# Patient Record
(demographics unavailable — no encounter records)

---

## 2025-03-28 NOTE — ASSESSMENT
[FreeTextEntry1] : Herpes gingivostomatitis, acute, resolving - PCR confirmed HSV - s/p 10d valtrex - for residual gingivitis, rec clobet oint for 5 days then stop.  - long term steroid use side effects such as skin atrophy, hypopigmentation and telangiectasis discussed - patient agrees to use topical steroids sparingly and as prescribed - ok to continue lidocaine mouthwash if helpful.  RTC 6 weeks, sooner PRN

## 2025-03-28 NOTE — HISTORY OF PRESENT ILLNESS
[de-identified] : Mr. BRIAN NEVAREZ  is a 23 year  y/o M  here for evaluation of below   # Hospital derm consult follow up- seen in ER 2 weeks ago and PCR confirmed HSV1 primary infection. Tx with 10d of PO valtrex. Doing much better now. has some redness along gums that has been slowly improving but the other mouth sores have healed.      Social Hx: here today accompanied by his mom  [FreeTextEntry1] : [facility]

## 2025-03-28 NOTE — HISTORY OF PRESENT ILLNESS
[de-identified] : Mr. BRIAN NEVAREZ  is a 23 year  y/o M  here for evaluation of below   # Hospital derm consult follow up- seen in ER 2 weeks ago and PCR confirmed HSV1 primary infection. Tx with 10d of PO valtrex. Doing much better now. has some redness along gums that has been slowly improving but the other mouth sores have healed.      Social Hx: here today accompanied by his mom  [FreeTextEntry1] : [facility] fu

## 2025-03-28 NOTE — PHYSICAL EXAM
[FreeTextEntry3] : AAOx3, NAD, well-appearing / pleasant Focused body exam only (see below) per patient request: linear gingival erythema

## 2025-05-22 NOTE — ASSESSMENT
[FreeTextEntry1] : Herpes gingivostomatitis, acute, resolved - PCR confirmed HSV1 - s/p 10d valtrex - for residual gingivitis, resolved with clobetasol ointment, pt has stopped using - sent valtrex 2g BID for 1 day with refills in case it recurs  RTC PRN

## 2025-05-22 NOTE — HISTORY OF PRESENT ILLNESS
[FreeTextEntry1] : [facility] fu [de-identified] : Mr. BRIAN NEVAREZ  is a 23 year  y/o M  here for evaluation of below   # Hospital derm consult follow up- seen in ER 2 weeks ago and PCR confirmed HSV1 primary infection. Tx with 10d of PO valtrex. Doing much better now. used clobetasol for residual gingivitis with good improvement, has stopped clobeatsol requesting prescription of valtrex in case of recurrence

## 2025-05-22 NOTE — PHYSICAL EXAM
[FreeTextEntry3] : AAOx3, NAD, well-appearing / pleasant Focused body exam only (see below) per patient request: linear gingival erythema, slightly improved from prior geographic tongue